# Patient Record
Sex: MALE | Race: BLACK OR AFRICAN AMERICAN | NOT HISPANIC OR LATINO | Employment: UNEMPLOYED | URBAN - METROPOLITAN AREA
[De-identification: names, ages, dates, MRNs, and addresses within clinical notes are randomized per-mention and may not be internally consistent; named-entity substitution may affect disease eponyms.]

---

## 2021-05-06 ENCOUNTER — HOSPITAL ENCOUNTER (EMERGENCY)
Facility: HOSPITAL | Age: 41
Discharge: HOME | End: 2021-05-06
Attending: EMERGENCY MEDICINE
Payer: MEDICAID

## 2021-05-06 VITALS
DIASTOLIC BLOOD PRESSURE: 69 MMHG | HEIGHT: 70 IN | OXYGEN SATURATION: 98 % | BODY MASS INDEX: 32.93 KG/M2 | RESPIRATION RATE: 18 BRPM | HEART RATE: 74 BPM | SYSTOLIC BLOOD PRESSURE: 110 MMHG | TEMPERATURE: 98 F | WEIGHT: 230 LBS

## 2021-05-06 DIAGNOSIS — R10.9 FLANK PAIN: ICD-10-CM

## 2021-05-06 DIAGNOSIS — L02.91 ABSCESS: ICD-10-CM

## 2021-05-06 DIAGNOSIS — M79.601 RIGHT ARM PAIN: Primary | ICD-10-CM

## 2021-05-06 DIAGNOSIS — R31.9 HEMATURIA, UNSPECIFIED TYPE: ICD-10-CM

## 2021-05-06 LAB
ALBUMIN SERPL BCP-MCNC: 4 G/DL (ref 3.5–5.2)
ALP SERPL-CCNC: 45 U/L (ref 38–126)
ALT SERPL W/O P-5'-P-CCNC: 30 U/L (ref 10–44)
ANION GAP SERPL CALC-SCNC: 5 MMOL/L (ref 8–16)
AST SERPL-CCNC: 29 U/L (ref 15–46)
BASOPHILS # BLD AUTO: 0.02 K/UL (ref 0–0.2)
BASOPHILS NFR BLD: 0.2 % (ref 0–1.9)
BILIRUB SERPL-MCNC: 0.7 MG/DL (ref 0.1–1)
BILIRUB UR QL STRIP: NEGATIVE
CALCIUM SERPL-MCNC: 8.9 MG/DL (ref 8.7–10.5)
CHLORIDE SERPL-SCNC: 105 MMOL/L (ref 95–110)
CLARITY UR REFRACT.AUTO: CLEAR
CO2 SERPL-SCNC: 28 MMOL/L (ref 23–29)
COLOR UR AUTO: YELLOW
CREAT SERPL-MCNC: 1 MG/DL (ref 0.5–1.4)
DIFFERENTIAL METHOD: NORMAL
EOSINOPHIL # BLD AUTO: 0.2 K/UL (ref 0–0.5)
EOSINOPHIL NFR BLD: 2.6 % (ref 0–8)
ERYTHROCYTE [DISTWIDTH] IN BLOOD BY AUTOMATED COUNT: 11.9 % (ref 11.5–14.5)
EST. GFR  (AFRICAN AMERICAN): >60 ML/MIN/1.73 M^2
EST. GFR  (NON AFRICAN AMERICAN): >60 ML/MIN/1.73 M^2
GLUCOSE SERPL-MCNC: 109 MG/DL (ref 70–110)
GLUCOSE UR QL STRIP: NEGATIVE
HCT VFR BLD AUTO: 42 % (ref 40–54)
HGB BLD-MCNC: 14.2 G/DL (ref 14–18)
HGB UR QL STRIP: ABNORMAL
IMM GRANULOCYTES # BLD AUTO: 0.02 K/UL (ref 0–0.04)
IMM GRANULOCYTES NFR BLD AUTO: 0.2 % (ref 0–0.5)
KETONES UR QL STRIP: NEGATIVE
LEUKOCYTE ESTERASE UR QL STRIP: NEGATIVE
LYMPHOCYTES # BLD AUTO: 2.7 K/UL (ref 1–4.8)
LYMPHOCYTES NFR BLD: 31.2 % (ref 18–48)
MCH RBC QN AUTO: 29.5 PG (ref 27–31)
MCHC RBC AUTO-ENTMCNC: 33.8 G/DL (ref 32–36)
MCV RBC AUTO: 87 FL (ref 82–98)
MONOCYTES # BLD AUTO: 0.7 K/UL (ref 0.3–1)
MONOCYTES NFR BLD: 8.4 % (ref 4–15)
NEUTROPHILS # BLD AUTO: 5 K/UL (ref 1.8–7.7)
NEUTROPHILS NFR BLD: 57.4 % (ref 38–73)
NITRITE UR QL STRIP: NEGATIVE
NRBC BLD-RTO: 0 /100 WBC
PH UR STRIP: 5 [PH] (ref 5–8)
PLATELET # BLD AUTO: 249 K/UL (ref 150–450)
PMV BLD AUTO: 9.2 FL (ref 9.2–12.9)
POTASSIUM SERPL-SCNC: 3.6 MMOL/L (ref 3.5–5.1)
PROT SERPL-MCNC: 7.5 G/DL (ref 6–8.4)
PROT UR QL STRIP: NEGATIVE
RBC # BLD AUTO: 4.82 M/UL (ref 4.6–6.2)
SODIUM SERPL-SCNC: 138 MMOL/L (ref 136–145)
SP GR UR STRIP: 1.01 (ref 1–1.03)
URN SPEC COLLECT METH UR: ABNORMAL
UROBILINOGEN UR STRIP-ACNC: NEGATIVE EU/DL
UUN UR-MCNC: 10 MG/DL (ref 2–20)
WBC # BLD AUTO: 8.77 K/UL (ref 3.9–12.7)

## 2021-05-06 PROCEDURE — 99285 EMERGENCY DEPT VISIT HI MDM: CPT | Mod: 25,ER

## 2021-05-06 PROCEDURE — 80053 COMPREHEN METABOLIC PANEL: CPT | Mod: ER | Performed by: PHYSICIAN ASSISTANT

## 2021-05-06 PROCEDURE — 81003 URINALYSIS AUTO W/O SCOPE: CPT | Mod: ER | Performed by: PHYSICIAN ASSISTANT

## 2021-05-06 PROCEDURE — 85025 COMPLETE CBC W/AUTO DIFF WBC: CPT | Mod: ER | Performed by: PHYSICIAN ASSISTANT

## 2021-05-06 RX ORDER — NAPROXEN 500 MG/1
500 TABLET ORAL 2 TIMES DAILY WITH MEALS
Qty: 14 TABLET | Refills: 0 | OUTPATIENT
Start: 2021-05-06 | End: 2022-05-10

## 2021-05-06 RX ORDER — METHOCARBAMOL 750 MG/1
1500 TABLET, FILM COATED ORAL 3 TIMES DAILY
Qty: 30 TABLET | Refills: 0 | Status: SHIPPED | OUTPATIENT
Start: 2021-05-06 | End: 2021-05-11

## 2021-12-17 ENCOUNTER — OFFICE VISIT (OUTPATIENT)
Dept: PODIATRY | Facility: CLINIC | Age: 41
End: 2021-12-17
Payer: MEDICAID

## 2021-12-17 VITALS
SYSTOLIC BLOOD PRESSURE: 118 MMHG | HEART RATE: 68 BPM | BODY MASS INDEX: 34.06 KG/M2 | HEIGHT: 70 IN | WEIGHT: 237.88 LBS | DIASTOLIC BLOOD PRESSURE: 70 MMHG

## 2021-12-17 DIAGNOSIS — M25.572 CHRONIC PAIN OF LEFT ANKLE: ICD-10-CM

## 2021-12-17 DIAGNOSIS — G89.29 CHRONIC PAIN OF LEFT ANKLE: ICD-10-CM

## 2021-12-17 DIAGNOSIS — Z98.890 HISTORY OF ANKLE SURGERY: ICD-10-CM

## 2021-12-17 DIAGNOSIS — M19.072 ARTHRITIS OF ANKLE, LEFT: Primary | ICD-10-CM

## 2021-12-17 PROCEDURE — 99999 PR PBB SHADOW E&M-EST. PATIENT-LVL III: CPT | Mod: PBBFAC,,, | Performed by: PODIATRIST

## 2021-12-17 PROCEDURE — 99203 PR OFFICE/OUTPT VISIT, NEW, LEVL III, 30-44 MIN: ICD-10-PCS | Mod: 25,S$PBB,, | Performed by: PODIATRIST

## 2021-12-17 PROCEDURE — 20605 INTERMEDIATE JOINT ASPIRATION/INJECTION: L ANKLE: ICD-10-PCS | Mod: S$PBB,LT,, | Performed by: PODIATRIST

## 2021-12-17 PROCEDURE — 99203 OFFICE O/P NEW LOW 30 MIN: CPT | Mod: 25,S$PBB,, | Performed by: PODIATRIST

## 2021-12-17 PROCEDURE — 99213 OFFICE O/P EST LOW 20 MIN: CPT | Mod: PBBFAC,PN,25 | Performed by: PODIATRIST

## 2021-12-17 PROCEDURE — 99999 PR PBB SHADOW E&M-EST. PATIENT-LVL III: ICD-10-PCS | Mod: PBBFAC,,, | Performed by: PODIATRIST

## 2021-12-17 PROCEDURE — 20605 DRAIN/INJ JOINT/BURSA W/O US: CPT | Mod: PBBFAC,LT,PN | Performed by: PODIATRIST

## 2021-12-17 RX ORDER — TRIAMCINOLONE ACETONIDE 40 MG/ML
40 INJECTION, SUSPENSION INTRA-ARTICULAR; INTRAMUSCULAR
Status: COMPLETED | OUTPATIENT
Start: 2021-12-17 | End: 2021-12-17

## 2021-12-17 RX ORDER — MELOXICAM 15 MG/1
15 TABLET ORAL DAILY
Qty: 30 TABLET | Refills: 1 | OUTPATIENT
Start: 2021-12-17 | End: 2022-05-10

## 2021-12-17 RX ADMIN — Medication 32 MG: at 08:12

## 2021-12-17 RX ADMIN — TRIAMCINOLONE ACETONIDE 40 MG: 40 INJECTION, SUSPENSION INTRA-ARTICULAR; INTRAMUSCULAR at 09:12

## 2022-05-10 ENCOUNTER — HOSPITAL ENCOUNTER (EMERGENCY)
Facility: HOSPITAL | Age: 42
Discharge: HOME | End: 2022-05-10
Attending: EMERGENCY MEDICINE
Payer: MEDICAID

## 2022-05-10 VITALS
DIASTOLIC BLOOD PRESSURE: 70 MMHG | HEIGHT: 70 IN | RESPIRATION RATE: 19 BRPM | HEART RATE: 70 BPM | OXYGEN SATURATION: 99 % | SYSTOLIC BLOOD PRESSURE: 120 MMHG | BODY MASS INDEX: 33.93 KG/M2 | WEIGHT: 237 LBS | TEMPERATURE: 98 F

## 2022-05-10 DIAGNOSIS — M54.50 ACUTE RIGHT-SIDED LOW BACK PAIN WITHOUT SCIATICA: Primary | ICD-10-CM

## 2022-05-10 LAB
BILIRUB UR QL STRIP: NEGATIVE
CLARITY UR REFRACT.AUTO: CLEAR
COLOR UR AUTO: YELLOW
GLUCOSE UR QL STRIP: NEGATIVE
HGB UR QL STRIP: NEGATIVE
KETONES UR QL STRIP: NEGATIVE
LEUKOCYTE ESTERASE UR QL STRIP: NEGATIVE
NITRITE UR QL STRIP: NEGATIVE
PH UR STRIP: 6 [PH] (ref 5–8)
PROT UR QL STRIP: ABNORMAL
SP GR UR STRIP: 1.02 (ref 1–1.03)
URN SPEC COLLECT METH UR: ABNORMAL
UROBILINOGEN UR STRIP-ACNC: NEGATIVE EU/DL

## 2022-05-10 PROCEDURE — 25000003 PHARM REV CODE 250: Mod: ER | Performed by: EMERGENCY MEDICINE

## 2022-05-10 PROCEDURE — 81003 URINALYSIS AUTO W/O SCOPE: CPT | Mod: ER | Performed by: EMERGENCY MEDICINE

## 2022-05-10 PROCEDURE — 99284 EMERGENCY DEPT VISIT MOD MDM: CPT | Mod: ER

## 2022-05-10 RX ORDER — CYCLOBENZAPRINE HCL 10 MG
10 TABLET ORAL 3 TIMES DAILY PRN
Qty: 15 TABLET | Refills: 0 | Status: SHIPPED | OUTPATIENT
Start: 2022-05-10 | End: 2022-05-15

## 2022-05-10 RX ORDER — CYCLOBENZAPRINE HCL 10 MG
10 TABLET ORAL
Status: COMPLETED | OUTPATIENT
Start: 2022-05-10 | End: 2022-05-10

## 2022-05-10 RX ORDER — KETOROLAC TROMETHAMINE 10 MG/1
10 TABLET, FILM COATED ORAL
Status: COMPLETED | OUTPATIENT
Start: 2022-05-10 | End: 2022-05-10

## 2022-05-10 RX ORDER — KETOROLAC TROMETHAMINE 10 MG/1
10 TABLET, FILM COATED ORAL EVERY 8 HOURS PRN
Qty: 15 TABLET | Refills: 0 | Status: SHIPPED | OUTPATIENT
Start: 2022-05-10

## 2022-05-10 RX ADMIN — CYCLOBENZAPRINE 10 MG: 10 TABLET, FILM COATED ORAL at 11:05

## 2022-05-10 RX ADMIN — KETOROLAC TROMETHAMINE 10 MG: 10 TABLET, FILM COATED ORAL at 11:05

## 2022-05-11 NOTE — ED PROVIDER NOTES
Encounter Date: 5/10/2022       History     Chief Complaint   Patient presents with    Back Pain     C/o lower right sided back pain ongoing for 3 days. Denies any injury. States sometimes it hurts to urinate. Denies any fever. No penile drainage or blood in urine     Patient reports a chief complaint of back pain.  This is isolated to the right, lower back.  This pain does not radiate down the lateral thigh.  There has been no trauma.  Onset was noted 3 day ago.  There is no numbness, weakness, incontinence reported.  Patient has not attempted treatment at home with over-the-counter medications.          Review of patient's allergies indicates:  No Known Allergies  History reviewed. No pertinent past medical history.  Past Surgical History:   Procedure Laterality Date    ANKLE SURGERY       History reviewed. No pertinent family history.  Social History     Tobacco Use    Smoking status: Never Smoker    Smokeless tobacco: Never Used   Substance Use Topics    Alcohol use: No    Drug use: Yes     Types: Marijuana     Review of Systems   Constitutional: Negative for chills and fever.   HENT: Negative for congestion and sore throat.    Respiratory: Negative for chest tightness and shortness of breath.    Cardiovascular: Negative for chest pain and palpitations.   Gastrointestinal: Negative for abdominal pain and vomiting.   Genitourinary: Negative for difficulty urinating and dysuria.   Musculoskeletal: Positive for back pain. Negative for neck pain.   Skin: Negative for color change and rash.   Allergic/Immunologic: Negative for immunocompromised state.   Neurological: Negative for weakness and numbness.   Hematological: Negative for adenopathy.   All other systems reviewed and are negative.      Physical Exam     Initial Vitals [05/10/22 2102]   BP Pulse Resp Temp SpO2   112/66 77 19 98.4 °F (36.9 °C) 99 %      MAP       --         Physical Exam    Constitutional: He appears well-developed and well-nourished. He  is not diaphoretic. No distress.   HENT:   Head: Normocephalic and atraumatic.   Cardiovascular: Normal rate, regular rhythm, normal heart sounds and intact distal pulses.   Pulmonary/Chest: Breath sounds normal. No respiratory distress.   Musculoskeletal:      Lumbar back: Tenderness present. No swelling, deformity, spasms or bony tenderness. Normal range of motion.        Back:      Neurological: He is alert and oriented to person, place, and time.   Skin: Skin is warm and dry.         ED Course   Procedures  Labs Reviewed   URINALYSIS, REFLEX TO URINE CULTURE - Abnormal; Notable for the following components:       Result Value    Protein, UA Trace (*)     All other components within normal limits    Narrative:     Preferred Collection Type->Urine, Clean Catch  Specimen Source->Urine  Collection Type->Urine, Clean Catch          Imaging Results    None          Medications   cyclobenzaprine tablet 10 mg (10 mg Oral Given 5/10/22 2340)   ketorolac tablet 10 mg (10 mg Oral Given 5/10/22 2340)     Medical Decision Making:   ED Management:  All findings were reviewed with the patient/family in detail.  We have discussed with him the protein urea identified on urinalysis and accordingly recommended outpatient follow-up for further clarification.  I see no indication of an emergent process beyond that addressed during our encounter but have duly counseled the patient/family regarding the need for prompt follow-up as well as the indications that should prompt immediate return to the emergency room should new or worrisome developments occur.  The patient has additionally been provided with printed information regarding diagnosis as well as instructions regarding follow up and any medications intended to manage the patient's aforementioned conditions.  The patient/family communicates understanding of all this information and all remaining questions and concerns were addressed at this time.                             Clinical Impression:   Final diagnoses:  [M54.50] Acute right-sided low back pain without sciatica (Primary)          ED Disposition Condition    Discharge Stable        ED Prescriptions     Medication Sig Dispense Start Date End Date Auth. Provider    cyclobenzaprine (FLEXERIL) 10 MG tablet Take 1 tablet (10 mg total) by mouth 3 (three) times daily as needed for Muscle spasms. 15 tablet 5/10/2022 5/15/2022 Sanchez Rios MD    ketorolac (TORADOL) 10 mg tablet Take 1 tablet (10 mg total) by mouth every 8 (eight) hours as needed for Pain. 15 tablet 5/10/2022  Sanchez Rios MD        Follow-up Information     Follow up With Specialties Details Why Contact Info    PCP  Schedule an appointment as soon as possible for a visit  for reassessment     [facility] Emergency Dept Emergency Medicine Go to  As needed, If symptoms worsen [address] [facility] [address] [phone]           Sanchez Rios MD  05/11/22 5519

## 2023-06-24 ENCOUNTER — HOSPITAL ENCOUNTER (EMERGENCY)
Facility: HOSPITAL | Age: 43
Discharge: HOME | End: 2023-06-24
Attending: STUDENT IN AN ORGANIZED HEALTH CARE EDUCATION/TRAINING PROGRAM
Payer: MEDICAID

## 2023-06-24 VITALS
HEART RATE: 93 BPM | WEIGHT: 220 LBS | OXYGEN SATURATION: 99 % | BODY MASS INDEX: 31.57 KG/M2 | DIASTOLIC BLOOD PRESSURE: 69 MMHG | SYSTOLIC BLOOD PRESSURE: 126 MMHG | RESPIRATION RATE: 20 BRPM | TEMPERATURE: 99 F

## 2023-06-24 DIAGNOSIS — S71.131A PUNCTURE WOUND OF RIGHT THIGH, INITIAL ENCOUNTER: ICD-10-CM

## 2023-06-24 DIAGNOSIS — S70.11XA HEMATOMA OF RIGHT THIGH, INITIAL ENCOUNTER: Primary | ICD-10-CM
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
## 2023-06-24 PROCEDURE — 90715 TDAP VACCINE 7 YRS/> IM: CPT | Mod: ER | Performed by: STUDENT IN AN ORGANIZED HEALTH CARE EDUCATION/TRAINING PROGRAM

## 2023-06-24 PROCEDURE — 25000003 PHARM REV CODE 250: Mod: ER | Performed by: STUDENT IN AN ORGANIZED HEALTH CARE EDUCATION/TRAINING PROGRAM

## 2023-06-24 PROCEDURE — 90471 IMMUNIZATION ADMIN: CPT | Mod: ER | Performed by: STUDENT IN AN ORGANIZED HEALTH CARE EDUCATION/TRAINING PROGRAM

## 2023-06-24 PROCEDURE — 99284 EMERGENCY DEPT VISIT MOD MDM: CPT | Mod: ER

## 2023-06-24 PROCEDURE — 63600175 PHARM REV CODE 636 W HCPCS: Mod: ER | Performed by: STUDENT IN AN ORGANIZED HEALTH CARE EDUCATION/TRAINING PROGRAM

## 2023-06-24 RX ORDER — AMOXICILLIN AND CLAVULANATE POTASSIUM 875; 125 MG/1; MG/1
1 TABLET, FILM COATED ORAL
Status: COMPLETED | OUTPATIENT
Start: 2023-06-24 | End: 2023-06-24

## 2023-06-24 RX ORDER — AMOXICILLIN AND CLAVULANATE POTASSIUM 875; 125 MG/1; MG/1
1 TABLET, FILM COATED ORAL 2 TIMES DAILY
Qty: 14 TABLET | Refills: 0 | Status: SHIPPED | OUTPATIENT
Start: 2023-06-24

## 2023-06-24 RX ADMIN — AMOXICILLIN AND CLAVULANATE POTASSIUM 1 TABLET: 875; 125 TABLET, FILM COATED ORAL at 02:06

## 2023-06-24 RX ADMIN — BACITRACIN ZINC, NEOMYCIN SULFATE, POLYMYXIN B SULFATE 1 EACH: 3.5; 5000; 4 OINTMENT TOPICAL at 02:06

## 2023-06-24 RX ADMIN — TETANUS TOXOID, REDUCED DIPHTHERIA TOXOID AND ACELLULAR PERTUSSIS VACCINE, ADSORBED 0.5 ML: 5; 2.5; 8; 8; 2.5 SUSPENSION INTRAMUSCULAR at 02:06

## 2023-06-24 NOTE — ED NOTES
[employer] contacted to report dog bite.  Per dispatcher, an officer will present to ED to interview pt.

## 2023-06-27 NOTE — ED PROVIDER NOTES
NAME:  Titi Richmond  CSN:     [id number]  MRN:    [id number]  ADMIT DATE: 6/24/2023        eMERGENCY dEPARTMENT eNCOUnter    CHIEF COMPLAINT    Chief Complaint   Patient presents with    Animal Bite     Pt states he was bit by a friends dog to inner left thigh approx 1hr PTA. No active bleeding       HPI    Titi Richmond is a 43 y.o. male with a past medical history of  has no past medical history on file.     he presents to the ED due to dog bite to left posterior thigh.  Unsure of last tetanus shot.  Denies any other injuries.  States it is his friend's dog and is not concerned about rabies at this time.      HPI       PAST MEDICAL HISTORY  No past medical history on file.    SURGICAL HISTORY    Past Surgical History:   Procedure Laterality Date    ANKLE SURGERY         FAMILY HISTORY    No family history on file.    SOCIAL HISTORY    Social History     Socioeconomic History    Marital status:    Tobacco Use    Smoking status: Never    Smokeless tobacco: Never   Substance and Sexual Activity    Alcohol use: No    Drug use: Yes     Types: Marijuana       MEDICATIONS  Current Outpatient Medications   Medication Instructions    amoxicillin-clavulanate 875-125mg (AUGMENTIN) 875-125 mg per tablet 1 tablet, Oral, 2 times daily    ketorolac (TORADOL) 10 mg, Oral, Every 8 hours PRN       ALLERGIES    Review of patient's allergies indicates:  No Known Allergies      REVIEW OF SYSTEMS   Review of Systems       PHYSICAL EXAM    Reviewed Triage Note    VITAL SIGNS:   ED Triage Vitals   Enc Vitals Group      BP 06/24/23 1338 126/69      Pulse 06/24/23 1335 93      Resp 06/24/23 1335 20      Temp 06/24/23 1335 98.6 °F (37 °C)      Temp Source 06/24/23 1335 Oral      SpO2 06/24/23 1335 99 %      Weight 06/24/23 1335 220 lb      Height --       Head Circumference --       Peak Flow --       Pain Score --       Pain Loc --       Pain Edu? --       Excl. in GC? --        No data found.    Physical Exam    Nursing note and  vitals reviewed.  Constitutional: He appears well-developed and well-nourished.   HENT:   Head: Normocephalic and atraumatic.   Eyes: EOM are normal. Pupils are equal, round, and reactive to light.   Neck: Neck supple.   Normal range of motion.  Cardiovascular:  Normal rate.           Pulmonary/Chest: No respiratory distress.   Musculoskeletal:         General: Normal range of motion.      Cervical back: Normal range of motion and neck supple.     Neurological: He is alert and oriented to person, place, and time.   Skin: Skin is warm and dry.   Puncture wound to left posterior thigh with underlying hematoma.  Hemostatic.  Superficial abrasion noted.  Tenderness to palpation of the left posterior thigh.   Psychiatric: He has a normal mood and affect.              EKG     Interpreted by EM physician if performed:               LABS  Pertinent labs reviewed. (See chart for details)   Labs Reviewed - No data to display      RADIOLOGY          Imaging Results              X-Ray Femur Ap/Lat Left (Final result)  Result time 06/24/23 15:17:07      Final result by Rangel Wen MD (06/24/23 15:17:07)                   Impression:      1.  Negative for acute process.      Electronically signed by: Rangel Wen MD  Date:    06/24/2023  Time:    15:17               Narrative:    EXAMINATION:  XR FEMUR 2 VIEW LEFT    CLINICAL HISTORY:  Bitten by dog, initial encounter    TECHNIQUE:  AP and lateral views of the left femur were performed.    COMPARISON:  None    FINDINGS:  The hip and knee joints are well maintained.  Negative for knee joint effusion.  Negative for fracture or dislocation.  Negative for soft tissue abnormalities.  Negative for radiopaque foreign bodies or air in the soft tissues.                                        PROCEDURES    Procedures      ED COURSE & MEDICAL DECISION MAKING    Pertinent Labs & Imaging studies reviewed. (See chart for details and specific orders.)        Summary of review of records:      MDM:  Titi Richmond is a 43 y.o. male who presents today for evaluation of a dog bite occurring 1 hour ago.  Single hemostatic puncture wound with underlying hematoma noted.  X-ray will be obtained to rule out foreign body, less likely fracture.  Wound will be cleaned and irrigated and tetanus will be updated.          Medications   Tdap (BOOSTRIX) vaccine injection 0.5 mL (0.5 mLs Intramuscular Given 6/24/23 1437)   neomycin-bacitracnZn-polymyxnB packet (1 each Topical (Top) Given 6/24/23 1438)   amoxicillin-clavulanate 875-125mg per tablet 1 tablet (1 tablet Oral Given 6/24/23 1438)       ED Course as of 06/27/23 1446   Sat Jun 24, 2023   1501 X-Ray Femur Ap/Lat Left  Interpreted independently by me.  No evidence of fracture or retained foreign body. [HL]      ED Course User Index  [HL] Jaelyn Galarza DO     Findings discussed with patient at bedside.  Reasons return discussed.  Prescriptions provided and all questions addressed.    FINAL IMPRESSION    Final diagnoses:  [W54.0XXA] Dog bite  [S70.11XA] Hematoma of right thigh, initial encounter (Primary)  [S71.131A] Puncture wound of right thigh, initial encounter       DISPOSITION  Patient discharged in stable condition        ED Prescriptions       Medication Sig Dispense Start Date End Date Auth. Provider    amoxicillin-clavulanate 875-125mg (AUGMENTIN) 875-125 mg per tablet Take 1 tablet by mouth 2 (two) times daily. 14 tablet 6/24/2023 -- Jaelyn Galarza DO          Follow-up Information       Follow up With Specialties Details Why Contact [facility] - Emergency Dept Emergency Medicine  As needed, If symptoms worsen [address] [facility] [address] [phone]    Cristel Figueroa MD Internal Medicine Schedule an appointment as soon as possible for a visit in 1 week  [address]  [address]  [facility] [address] [phone]                DISCLAIMER: This note was prepared with M*modal voice  recognition transcription software. Garbled syntax, mangled pronouns, and other bizarre constructions may be attributed to that software system.      DO Jaelyn Lubin,   06/27/23 1442

## 2024-01-07 ENCOUNTER — HOSPITAL ENCOUNTER (EMERGENCY)
Facility: HOSPITAL | Age: 44
Discharge: HOME | End: 2024-01-08
Attending: FAMILY MEDICINE
Payer: MEDICAID

## 2024-01-07 VITALS
OXYGEN SATURATION: 99 % | HEIGHT: 70 IN | HEART RATE: 71 BPM | WEIGHT: 215 LBS | BODY MASS INDEX: 30.78 KG/M2 | DIASTOLIC BLOOD PRESSURE: 71 MMHG | TEMPERATURE: 98 F | RESPIRATION RATE: 16 BRPM | SYSTOLIC BLOOD PRESSURE: 125 MMHG

## 2024-01-07 DIAGNOSIS — D16.01 OSTEOCHONDROMA OF HUMERUS, RIGHT: ICD-10-CM

## 2024-01-07 DIAGNOSIS — T78.40XA ALLERGIC REACTION, INITIAL ENCOUNTER: Primary | ICD-10-CM

## 2024-01-07 DIAGNOSIS — L50.9 URTICARIAL RASH: ICD-10-CM

## 2024-01-07 DIAGNOSIS — R52 PAIN: ICD-10-CM

## 2024-01-07 PROCEDURE — 99284 EMERGENCY DEPT VISIT MOD MDM: CPT | Mod: ER

## 2024-01-07 RX ORDER — DIPHENHYDRAMINE HCL 50 MG
50 CAPSULE ORAL
Status: COMPLETED | OUTPATIENT
Start: 2024-01-07 | End: 2024-01-08

## 2024-01-07 RX ORDER — DEXAMETHASONE SODIUM PHOSPHATE 4 MG/ML
4 INJECTION, SOLUTION INTRA-ARTICULAR; INTRALESIONAL; INTRAMUSCULAR; INTRAVENOUS; SOFT TISSUE
Status: COMPLETED | OUTPATIENT
Start: 2024-01-07 | End: 2024-01-08

## 2024-01-08 PROCEDURE — 25000003 PHARM REV CODE 250: Mod: ER | Performed by: FAMILY MEDICINE

## 2024-01-08 PROCEDURE — 63600175 PHARM REV CODE 636 W HCPCS: Mod: ER | Performed by: FAMILY MEDICINE

## 2024-01-08 PROCEDURE — 96372 THER/PROPH/DIAG INJ SC/IM: CPT | Performed by: FAMILY MEDICINE

## 2024-01-08 RX ORDER — PREDNISONE 20 MG/1
20 TABLET ORAL 2 TIMES DAILY
Qty: 10 TABLET | Refills: 0 | Status: SHIPPED | OUTPATIENT
Start: 2024-01-08

## 2024-01-08 RX ORDER — DIPHENHYDRAMINE HCL 25 MG
25 CAPSULE ORAL EVERY 6 HOURS PRN
Qty: 20 CAPSULE | Refills: 0 | Status: SHIPPED | OUTPATIENT
Start: 2024-01-08

## 2024-01-08 RX ADMIN — DIPHENHYDRAMINE HYDROCHLORIDE 50 MG: 50 CAPSULE ORAL at 12:01

## 2024-01-08 RX ADMIN — DEXAMETHASONE SODIUM PHOSPHATE 4 MG: 4 INJECTION, SOLUTION INTRA-ARTICULAR; INTRALESIONAL; INTRAMUSCULAR; INTRAVENOUS; SOFT TISSUE at 12:01

## 2024-01-08 NOTE — ED NOTES
LOC: The patient is awake, alert and aware of environment with an appropriate affect, the patient is oriented x 3 and speaking appropriate.  APPEARANCE: Patient resting comfortably and in no acute distress, patient is clean and well groomed, patient's clothing is properly fastened.  SKIN: The skin is warm and dry, patient has normal skin turgor and moist mucus membranes.  RESPIRATORY: Airway is open and patent, respirations are spontaneous, patient has a normal effort and rate.  CARDIAC: Patient has a normal rate and rhythm, no periphreal edema noted, capillary refill < 3 seconds.  ABDOMEN: Soft and non tender to palpation, no distention noted.  NEUROLOGIC: PERRL, facial expression is symmetrical, patient moving all extremities, normal sensation in all extremities when touched with a finger. Patient is able to grasp both hands and has equal strength and the patient was able to stick his tongue out and smile.  Patient also able to push against hands with his feet-equal strength bilaterally.  MUSCULOSKELETAL:  Mildly decreased ROM in right elbow, unable to straighten fully.

## 2024-01-08 NOTE — ED PROVIDER NOTES
"Encounter Date: 1/7/2024       History     Chief Complaint   Patient presents with    Urticaria     Pt to the Er states when he got out of the shower tonight, his chest started itching and has hives. Pt denies sob, changing soap or detergent, or using lotion.     Arm Pain     Pt states his right arm has been hurting him for months. Pt does not take anything for pain, denies injury - indicates area of pain is the "elbow joint"     43-year-old male complains of rash to his anterior chest and back with itching.  Started after eating cramps today.  No previous allergies.  Has not taken any medications.  Patient also complains of chronic right elbow pain.  For last 2 months.  Unknown injury.    The history is provided by the patient.     Review of patient's allergies indicates:  No Known Allergies  History reviewed. No pertinent past medical history.  Past Surgical History:   Procedure Laterality Date    ANKLE SURGERY       History reviewed. No pertinent family history.  Social History     Tobacco Use    Smoking status: Never    Smokeless tobacco: Never   Substance Use Topics    Alcohol use: No    Drug use: Yes     Types: Marijuana     Review of Systems   Skin:  Positive for rash.   All other systems reviewed and are negative.      Physical Exam     Initial Vitals [01/07/24 2306]   BP Pulse Resp Temp SpO2   125/71 71 16 98.4 °F (36.9 °C) 99 %      MAP       --         Physical Exam    Nursing note and vitals reviewed.  Constitutional: Vital signs are normal. He appears well-developed and well-nourished. He is active. No distress.   HENT:   Head: Normocephalic.   Nose: Nose normal.   Mouth/Throat: Oropharynx is clear and moist and mucous membranes are normal.   Eyes: Conjunctivae, EOM and lids are normal.   Neck: Neck supple.   Normal range of motion.  Cardiovascular:  Normal rate, regular rhythm, S1 normal, S2 normal and normal heart sounds.           Pulmonary/Chest: Breath sounds normal. No respiratory distress. He has " no wheezes. He has no rhonchi. He has no rales.   Abdominal: Abdomen is soft. Bowel sounds are normal. He exhibits no distension and no mass. There is no abdominal tenderness. There is no rebound and no guarding.   Musculoskeletal:      Right upper arm: Normal.      Left upper arm: Normal.      Cervical back: Normal range of motion and neck supple.      Right lower leg: Normal.      Left lower leg: Normal.      Comments: Right elbow pain.  Unable to completely extend.  Normal strength and neurovascular exam.     Neurological: He is alert and oriented to person, place, and time. He has normal strength. GCS score is 15. GCS eye subscore is 4. GCS verbal subscore is 5. GCS motor subscore is 6.   Skin: Skin is warm. Capillary refill takes less than 2 seconds. Rash noted. Rash is urticarial.   Urticarial rash on chest and back.   Psychiatric: He has a normal mood and affect. His speech is normal and behavior is normal. Thought content normal. Cognition and memory are normal.         ED Course   Procedures  Labs Reviewed - No data to display       Imaging Results              X-Ray Elbow Complete Right (Final result)  Result time 01/08/24 00:02:46      Final result by Thomas Greene MD (01/08/24 00:02:46)                   Impression:      As above.  Recommend nonemergent MRI elbow      Electronically signed by: Thomas Greene  Date:    01/08/2024  Time:    00:02               Narrative:    EXAMINATION:  XR ELBOW COMPLETE 3 VIEW RIGHT    CLINICAL HISTORY:  . Pain, unspecified    TECHNIQUE:  AP, lateral, and oblique views of the right elbow were performed.    COMPARISON:  None    FINDINGS:  Small subchondral lucency of the capitellum may reflect osteochondral lesion.  No acute displaced fracture.  Mild ulnohumeral osteoarthritis.  Joint spaces are maintained.  No significant joint effusion.                                       Medications   diphenhydrAMINE capsule 50 mg (50 mg Oral Given 1/8/24 0014)    dexAMETHasone injection 4 mg (4 mg Intramuscular Given 1/8/24 0015)     Medical Decision Making  Differential diagnosis and not limited to- allergic reaction, viral rash, contact dermatitis, elbow fracture, contusion, dislocation,    Patient is given Solu-Medrol, Benadryl in ED.  X-ray of right elbow- questionable abnormal growth.  Concern for osteochondroma    Patient is advised to avoid shrimp and seafood.  Even Benadryl and Decadron in ED along with prescription.  Patient is advised to see Orthopedics and referral placed.  Follow-up ED with any worsening symptoms.    Amount and/or Complexity of Data Reviewed  Radiology: ordered and independent interpretation performed. Decision-making details documented in ED Course.     Details: Abnormal brown growth at distal humerus.    Risk  OTC drugs.  Prescription drug management.                                      Clinical Impression:  Final diagnoses:  [R52] Pain  [T78.40XA] Allergic reaction, initial encounter (Primary)  [L50.9] Urticarial rash  [D16.01] Osteochondroma of humerus, right          ED Disposition Condition    Discharge Stable          ED Prescriptions       Medication Sig Dispense Start Date End Date Auth. Provider    predniSONE (DELTASONE) 20 MG tablet Take 1 tablet (20 mg total) by mouth 2 (two) times daily. 10 tablet 1/8/2024 -- Salo Mcdonald MD    diphenhydrAMINE (BENADRYL) 25 mg capsule Take 1 capsule (25 mg total) by mouth every 6 (six) hours as needed for Itching or Allergies. 20 capsule 1/8/2024 -- Salo Mcdonald MD          Follow-up Information    None          Salo Mcdonald MD  01/08/24 0021

## 2024-09-08 ENCOUNTER — HOSPITAL ENCOUNTER (EMERGENCY)
Facility: HOSPITAL | Age: 44
Discharge: HOME | End: 2024-09-08
Attending: EMERGENCY MEDICINE
Payer: MEDICAID

## 2024-09-08 VITALS
HEIGHT: 70 IN | BODY MASS INDEX: 31.5 KG/M2 | WEIGHT: 220 LBS | DIASTOLIC BLOOD PRESSURE: 75 MMHG | OXYGEN SATURATION: 99 % | HEART RATE: 71 BPM | SYSTOLIC BLOOD PRESSURE: 122 MMHG | TEMPERATURE: 98 F | RESPIRATION RATE: 24 BRPM

## 2024-09-08 DIAGNOSIS — R07.9 CHEST PAIN: ICD-10-CM

## 2024-09-08 DIAGNOSIS — R07.89 CHEST WALL PAIN: Primary | ICD-10-CM

## 2024-09-08 LAB
ALBUMIN SERPL BCP-MCNC: 4.3 G/DL (ref 3.5–5.2)
ALP SERPL-CCNC: 45 U/L (ref 38–126)
ALT SERPL W/O P-5'-P-CCNC: 44 U/L (ref 10–44)
ANION GAP SERPL CALC-SCNC: 5 MMOL/L (ref 8–16)
AST SERPL-CCNC: 44 U/L (ref 15–46)
BASOPHILS # BLD AUTO: 0.02 K/UL (ref 0–0.2)
BASOPHILS NFR BLD: 0.2 % (ref 0–1.9)
BILIRUB SERPL-MCNC: 1.2 MG/DL (ref 0.1–1)
CALCIUM SERPL-MCNC: 9.6 MG/DL (ref 8.7–10.5)
CHLORIDE SERPL-SCNC: 105 MMOL/L (ref 95–110)
CO2 SERPL-SCNC: 28 MMOL/L (ref 23–29)
CREAT SERPL-MCNC: 1.07 MG/DL (ref 0.5–1.4)
DIFFERENTIAL METHOD BLD: NORMAL
EOSINOPHIL # BLD AUTO: 0.3 K/UL (ref 0–0.5)
EOSINOPHIL NFR BLD: 3 % (ref 0–8)
ERYTHROCYTE [DISTWIDTH] IN BLOOD BY AUTOMATED COUNT: 11.7 % (ref 11.5–14.5)
EST. GFR  (NO RACE VARIABLE): >60 ML/MIN/1.73 M^2
GLUCOSE SERPL-MCNC: 118 MG/DL (ref 70–110)
HCT VFR BLD AUTO: 43.5 % (ref 40–54)
HGB BLD-MCNC: 15 G/DL (ref 14–18)
IMM GRANULOCYTES # BLD AUTO: 0.02 K/UL (ref 0–0.04)
IMM GRANULOCYTES NFR BLD AUTO: 0.2 % (ref 0–0.5)
LYMPHOCYTES # BLD AUTO: 3.3 K/UL (ref 1–4.8)
LYMPHOCYTES NFR BLD: 39.6 % (ref 18–48)
MAGNESIUM SERPL-MCNC: 1.8 MG/DL (ref 1.6–2.6)
MCH RBC QN AUTO: 30.1 PG (ref 27–31)
MCHC RBC AUTO-ENTMCNC: 34.5 G/DL (ref 32–36)
MCV RBC AUTO: 87 FL (ref 82–98)
MONOCYTES # BLD AUTO: 0.7 K/UL (ref 0.3–1)
MONOCYTES NFR BLD: 8 % (ref 4–15)
NEUTROPHILS # BLD AUTO: 4.1 K/UL (ref 1.8–7.7)
NEUTROPHILS NFR BLD: 49 % (ref 38–73)
NRBC BLD-RTO: 0 /100 WBC
PLATELET # BLD AUTO: 244 K/UL (ref 150–450)
PMV BLD AUTO: 9.9 FL (ref 9.2–12.9)
POTASSIUM SERPL-SCNC: 4.2 MMOL/L (ref 3.5–5.1)
PROT SERPL-MCNC: 7.9 G/DL (ref 6–8.4)
RBC # BLD AUTO: 4.99 M/UL (ref 4.6–6.2)
SODIUM SERPL-SCNC: 138 MMOL/L (ref 136–145)
TROPONIN I SERPL-MCNC: 0.01 NG/ML (ref 0.01–0.03)
UUN UR-MCNC: 14 MG/DL (ref 2–20)
WBC # BLD AUTO: 8.38 K/UL (ref 3.9–12.7)

## 2024-09-08 PROCEDURE — 93005 ELECTROCARDIOGRAM TRACING: CPT | Mod: ER

## 2024-09-08 PROCEDURE — 63600175 PHARM REV CODE 636 W HCPCS: Mod: ER | Performed by: EMERGENCY MEDICINE

## 2024-09-08 PROCEDURE — 80053 COMPREHEN METABOLIC PANEL: CPT | Mod: ER | Performed by: EMERGENCY MEDICINE

## 2024-09-08 PROCEDURE — 93010 ELECTROCARDIOGRAM REPORT: CPT | Mod: ,,, | Performed by: INTERNAL MEDICINE

## 2024-09-08 PROCEDURE — 83735 ASSAY OF MAGNESIUM: CPT | Mod: ER | Performed by: EMERGENCY MEDICINE

## 2024-09-08 PROCEDURE — 96374 THER/PROPH/DIAG INJ IV PUSH: CPT | Mod: 59,ER

## 2024-09-08 PROCEDURE — 25000003 PHARM REV CODE 250: Mod: ER | Performed by: EMERGENCY MEDICINE

## 2024-09-08 PROCEDURE — 25500020 PHARM REV CODE 255: Mod: ER | Performed by: EMERGENCY MEDICINE

## 2024-09-08 PROCEDURE — 99900035 HC TECH TIME PER 15 MIN (STAT): Mod: ER

## 2024-09-08 PROCEDURE — 99285 EMERGENCY DEPT VISIT HI MDM: CPT | Mod: 25,ER

## 2024-09-08 PROCEDURE — 84484 ASSAY OF TROPONIN QUANT: CPT | Mod: ER | Performed by: EMERGENCY MEDICINE

## 2024-09-08 PROCEDURE — 94799 UNLISTED PULMONARY SVC/PX: CPT | Mod: ER

## 2024-09-08 PROCEDURE — 85025 COMPLETE CBC W/AUTO DIFF WBC: CPT | Mod: ER | Performed by: EMERGENCY MEDICINE

## 2024-09-08 RX ORDER — HYDROCODONE BITARTRATE AND ACETAMINOPHEN 5; 325 MG/1; MG/1
1 TABLET ORAL
Status: COMPLETED | OUTPATIENT
Start: 2024-09-08 | End: 2024-09-08

## 2024-09-08 RX ORDER — METHOCARBAMOL 500 MG/1
1000 TABLET, FILM COATED ORAL 3 TIMES DAILY
Qty: 30 TABLET | Refills: 0 | Status: SHIPPED | OUTPATIENT
Start: 2024-09-08 | End: 2024-09-13

## 2024-09-08 RX ORDER — KETOROLAC TROMETHAMINE 30 MG/ML
15 INJECTION, SOLUTION INTRAMUSCULAR; INTRAVENOUS
Status: COMPLETED | OUTPATIENT
Start: 2024-09-08 | End: 2024-09-08

## 2024-09-08 RX ADMIN — KETOROLAC TROMETHAMINE 15 MG: 30 INJECTION, SOLUTION INTRAMUSCULAR at 12:09

## 2024-09-08 RX ADMIN — HYDROCODONE BITARTRATE AND ACETAMINOPHEN 1 TABLET: 5; 325 TABLET ORAL at 10:09

## 2024-09-08 RX ADMIN — IOHEXOL 100 ML: 350 INJECTION, SOLUTION INTRAVENOUS at 11:09

## 2024-09-08 NOTE — ED PROVIDER NOTES
Encounter Date: 9/8/2024       History     Chief Complaint   Patient presents with    Chest Pain     PT states he has chest pain that stated 3-4 days ago, pt states feels like he has pressure on his chest pt states the pain radiates to his back and worsens when moving his arms or bending down. Denies nausea, vomiting or sweating.      44-year-old male presents with 3-4 days of chest pain that radiates to his back.  Pain is worsened with movement of his upper body or deep breathing.  No hemoptysis.  Patient says he works as .  Said he had similar pain a year ago but today is worse.         Review of patient's allergies indicates:  No Known Allergies  History reviewed. No pertinent past medical history.  Past Surgical History:   Procedure Laterality Date    ANKLE SURGERY       No family history on file.  Social History     Tobacco Use    Smoking status: Never    Smokeless tobacco: Never   Substance Use Topics    Alcohol use: No    Drug use: Yes     Types: Marijuana     Review of Systems   Constitutional:  Negative for appetite change.   Eyes:  Negative for pain.   Respiratory:  Negative for shortness of breath.    Cardiovascular:  Positive for chest pain.   Gastrointestinal:  Negative for abdominal pain, nausea and vomiting.   Genitourinary:  Negative for frequency.   Musculoskeletal:  Negative for arthralgias and neck pain.   Neurological:  Negative for headaches.   Psychiatric/Behavioral:  Negative for confusion.        Physical Exam     Initial Vitals   BP Pulse Resp Temp SpO2   09/08/24 0944 09/08/24 0944 09/08/24 0932 09/08/24 0944 09/08/24 0944   129/83 70 18 97.8 °F (36.6 °C) 97 %      MAP       --                Physical Exam    Nursing note and vitals reviewed.  HENT:   Head: Atraumatic.   Eyes: Conjunctivae and EOM are normal.   Neck:   Normal range of motion.  Cardiovascular:      Exam reveals no gallop and no friction rub.       No murmur heard.  Pulmonary/Chest: Breath sounds normal. No  respiratory distress. He has no wheezes. He has no rales.   No reproducible tenderness however pain is elicited with deep breathing or movement   Abdominal: Abdomen is soft. Bowel sounds are normal. He exhibits no distension. There is no abdominal tenderness.   Musculoskeletal:         General: No edema. Normal range of motion.      Cervical back: Normal range of motion.     Neurological: He is alert and oriented to person, place, and time.   Skin: Skin is warm and dry.   Psychiatric: He has a normal mood and affect.         ED Course   Procedures  Labs Reviewed   TROPONIN I       Result Value    Troponin I 0.015     COMPREHENSIVE METABOLIC PANEL   CBC W/ AUTO DIFFERENTIAL   MAGNESIUM     EKG Readings: (Independently Interpreted)   Initial Reading: No STEMI. Rhythm: Normal Sinus Rhythm. Heart Rate: 71. Ectopy: No Ectopy. Conduction: Normal.       Imaging Results              CTA Chest Non-Coronary (PE Studies) (Final result)  Result time 09/08/24 12:01:40      Final result by Michael Evans MD (09/08/24 12:01:40)                   Impression:      No pulmonary embolus or acute pulmonary opacity.    All CT scans at this facility are performed  using dose modulation techniques as appropriate to performed exam including the following:  automated exposure control; adjustment of mA and/or kV according to the patients size (this includes techniques or standardized protocols for targeted exams where dose is matched to indication/reason for exam: i.e. extremities or head);  iterative reconstruction technique.      Electronically signed by: Michael Evans  Date:    09/08/2024  Time:    12:01               Narrative:    EXAMINATION:  CTA CHEST NON CORONARY (PE STUDIES)    CLINICAL HISTORY:  Pulmonary embolism (PE) suspected, unknown D-dimer;    TECHNIQUE:  Low dose axial images, sagittal and coronal reformations were obtained from the thoracic inlet to the lung bases following the IV administration of 100 mL of Omnipaque  350.  Contrast timing was optimized to evaluate the pulmonary arteries.  MIP images were performed.    COMPARISON:  None    FINDINGS:  Base of Neck: No significant abnormality.    Thoracic soft tissues: Unremarkable.    Aorta: Left-sided aortic arch.  No aneurysm and no significant atherosclerosis    Heart: Normal size. No effusion.    Pulmonary vasculature: Pulmonary arteries are well opacified.  There is no pulmonary thromboembolism.    Sridevi/Mediastinum: No pathologic enma enlargement.    Airways: Patent.    Lungs/Pleura: Clear lungs. No pleural effusion or thickening.    Esophagus: Unremarkable.    Upper Abdomen: No abnormality of the partially imaged upper abdomen.    Bones: No acute fracture. No suspicious lytic or sclerotic lesions.                                       X-Ray Chest AP Portable (Final result)  Result time 09/08/24 10:12:16      Final result by Michael Evans MD (09/08/24 10:12:16)                   Impression:      No acute abnormality.      Electronically signed by: Michael Evans  Date:    09/08/2024  Time:    10:12               Narrative:    EXAMINATION:  XR CHEST AP PORTABLE    CLINICAL HISTORY:  chest pain;    TECHNIQUE:  Single frontal view of the chest was performed.    COMPARISON:  Multiple    FINDINGS:  The lungs are clear, with normal appearance of pulmonary vasculature and no pleural effusion or pneumothorax.    The cardiac silhouette is normal in size. The hilar and mediastinal contours are unremarkable.    Bones are intact.                                    X-Rays:   Independently Interpreted Readings:   Chest X-Ray: No acute abnormalities.  No infiltrates.  Normal heart size.     Medications   ketorolac injection 15 mg (has no administration in time range)   HYDROcodone-acetaminophen 5-325 mg per tablet 1 tablet (1 tablet Oral Given 9/8/24 1054)   iohexoL (OMNIPAQUE 350) injection 100 mL (100 mLs Intravenous Given 9/8/24 1126)     Medical Decision Making  44-year-old male  presenting with 3-4 days of midsternal chest pain that radiates to the back, worse with movement or deep breathing.  Vital signs unremarkable.  Lung sounds are clear though patient has pain with deep breathing or movement.  Pulses equal in all extremities.    Amount and/or Complexity of Data Reviewed  Labs: ordered.  Radiology: ordered.    Risk  Prescription drug management.               ED Course as of 09/08/24 1210   Sun Sep 08, 2024   1036 Labs unremarkable.  Trop wnl.   [SN]   1036 CTA pending. [SN]   1209 CTA shows no pulmonary embolism.  No evidence of dissection.  Suspect chest wall pain.  Plan for symptomatic treatment.  Will also prescribe incentive spirometer.  Primary care follow up. [SN]      ED Course User Index  [SN] Antelmo Maciel MD                           Clinical Impression:  Final diagnoses:  [R07.9] Chest pain  [R07.89] Chest wall pain (Primary)          ED Disposition Condition    Discharge Stable          ED Prescriptions       Medication Sig Dispense Start Date End Date Auth. Provider    methocarbamoL (ROBAXIN) 500 MG Tab Take 2 tablets (1,000 mg total) by mouth 3 (three) times daily. for 5 days 30 tablet 9/8/2024 9/13/2024 Antelmo Maciel MD          Follow-up Information       Follow up With Specialties Details Why Contact Info    Cristel Figueroa MD Internal Medicine Schedule an appointment as soon as possible for a visit in 3 days  [address]  [address]  [facility]  [facility] [address]  [phone]               Antelmo Maciel MD  09/08/24 1210

## 2024-09-08 NOTE — ED NOTES
PT given a blanket and states he is in pain. Medic notified and pain medication pulled and administered.

## 2024-09-09 LAB
OHS QRS DURATION: 92 MS
OHS QTC CALCULATION: 406 MS

## 2025-05-19 ENCOUNTER — HOSPITAL ENCOUNTER (OUTPATIENT)
Dept: RADIOLOGY | Facility: HOSPITAL | Age: 45
Discharge: HOME | End: 2025-05-19
Attending: INTERNAL MEDICINE
Payer: MEDICAID

## 2025-05-19 DIAGNOSIS — M25.572 PAIN IN LEFT ANKLE AND JOINTS OF LEFT FOOT: ICD-10-CM

## 2025-05-19 DIAGNOSIS — M54.50 LOWER BACK PAIN: ICD-10-CM

## 2025-05-19 PROCEDURE — 72100 X-RAY EXAM L-S SPINE 2/3 VWS: CPT | Mod: TC,FY,PN

## 2025-05-19 PROCEDURE — 73600 X-RAY EXAM OF ANKLE: CPT | Mod: TC,FY,PN,LT

## 2025-05-19 PROCEDURE — 73600 X-RAY EXAM OF ANKLE: CPT | Mod: 26,LT,, | Performed by: STUDENT IN AN ORGANIZED HEALTH CARE EDUCATION/TRAINING PROGRAM

## 2025-05-19 PROCEDURE — 72100 X-RAY EXAM L-S SPINE 2/3 VWS: CPT | Mod: 26,,, | Performed by: STUDENT IN AN ORGANIZED HEALTH CARE EDUCATION/TRAINING PROGRAM

## 2025-05-20 ENCOUNTER — HOSPITAL ENCOUNTER (OUTPATIENT)
Dept: RADIOLOGY | Facility: HOSPITAL | Age: 45
Discharge: HOME | End: 2025-05-20
Attending: INTERNAL MEDICINE
Payer: MEDICAID

## 2025-05-20 DIAGNOSIS — R10.11 RIGHT UPPER QUADRANT PAIN: ICD-10-CM

## 2025-05-20 PROCEDURE — 76700 US EXAM ABDOM COMPLETE: CPT | Mod: TC,PN

## 2025-05-20 PROCEDURE — 76700 US EXAM ABDOM COMPLETE: CPT | Mod: 26,,, | Performed by: RADIOLOGY
